# Patient Record
Sex: FEMALE | Race: BLACK OR AFRICAN AMERICAN | Employment: OTHER | URBAN - METROPOLITAN AREA
[De-identification: names, ages, dates, MRNs, and addresses within clinical notes are randomized per-mention and may not be internally consistent; named-entity substitution may affect disease eponyms.]

---

## 2022-06-07 ENCOUNTER — OFFICE VISIT (OUTPATIENT)
Dept: URGENT CARE | Facility: URGENT CARE | Age: 47
End: 2022-06-07
Payer: COMMERCIAL

## 2022-06-07 VITALS
WEIGHT: 155 LBS | DIASTOLIC BLOOD PRESSURE: 82 MMHG | HEART RATE: 79 BPM | TEMPERATURE: 98.1 F | OXYGEN SATURATION: 99 % | SYSTOLIC BLOOD PRESSURE: 122 MMHG

## 2022-06-07 DIAGNOSIS — H65.92 OME (OTITIS MEDIA WITH EFFUSION), LEFT: ICD-10-CM

## 2022-06-07 DIAGNOSIS — H92.02 LEFT EAR PAIN: Primary | ICD-10-CM

## 2022-06-07 PROCEDURE — 99203 OFFICE O/P NEW LOW 30 MIN: CPT | Performed by: FAMILY MEDICINE

## 2022-06-07 RX ORDER — DULOXETIN HYDROCHLORIDE 60 MG/1
CAPSULE, DELAYED RELEASE ORAL
COMMUNITY
Start: 2022-04-18

## 2022-06-07 RX ORDER — BUPROPION HYDROCHLORIDE 300 MG/1
300 TABLET ORAL DAILY
COMMUNITY
Start: 2022-04-21

## 2022-06-07 RX ORDER — AMOXICILLIN 500 MG/1
500 CAPSULE ORAL 3 TIMES DAILY
Qty: 30 CAPSULE | Refills: 0 | Status: SHIPPED | OUTPATIENT
Start: 2022-06-07 | End: 2022-06-17

## 2025-01-29 NOTE — PROGRESS NOTES
Chief Complaint   Patient presents with     Urgent Care     Ear Problem     C/O ear pain for 3 days       Medical Decision Making:  Reviewed with patient about the findings of the clinical exam we will treat with the antibiotic also can do Tylenol to help with the pain if symptoms do not get better over the next 2 to 3 days should follow-up for further relation and treatment.  ASSESMENT AND PLAN    Leonela was seen today for urgent care and ear problem.    Diagnoses and all orders for this visit:    Left ear pain    OME (otitis media with effusion), left  -     amoxicillin (AMOXIL) 500 MG capsule; Take 1 capsule (500 mg) by mouth 3 times daily for 10 days        Tylenol and Rest    Differential Diagnosis:  URI Adult/Peds:  Acute right otitis media, Acute left otitis media, Sinusitis and et dysfunction       See orders in Epic  Pt verbalized and agreed with the plan and is aware of the worsening symptoms for which would need to follow up .  Pt was stable during time of discharge from the clinic     X-Ray was not done.  Time  spent on the date of the encounter doing chart review, patient visit and documentation   SUBJECTIVE   Leonela Dalton is a 46 year old female who presents with left ear pain, fullness and pressure for 3 day(s).   Severity: moderate   Timing:sudden onset  Additional symptoms include nasal congestion.      History of recurrent otitis: no    No past medical history on file.  Current Outpatient Medications   Medication Sig Dispense Refill     buPROPion (WELLBUTRIN XL) 300 MG 24 hr tablet Take 300 mg by mouth daily       DULoxetine (CYMBALTA) 60 MG capsule TAKE 1 CAPSULE BY MOUTH EVERY DAY       Social History     Tobacco Use     Smoking status: Never Smoker     Smokeless tobacco: Never Used   Substance Use Topics     Alcohol use: Not on file           ROS:    10 point ROS of systems including Constitutional, Eyes, Respiratory, Cardiovascular, Gastroenterology, Genitourinary, Integumentary,  Muscularskeletal, Psychiatric ,neurological were all negative except for pertinent positives noted in my HPI       OBJECTIVE:  /82   Pulse 79   Temp 98.1  F (36.7  C) (Tympanic)   Wt 70.3 kg (155 lb)   LMP 06/07/2022   SpO2 99%    EXAM:  The right TM is air/fluid interface     The right auditory canal is normal and without drainage, edema or erythema  The left TM is air/fluid interface and bullae present  The left auditory canal is normal and without drainage, edema or erythema  Oropharynx exam is normal: no lesions, erythema, adenopathy or exudate.  GENERAL: no acute distress  EYES: EOMI,  PERRL, conjunctiva clear  NECK: supple, non-tender to palpation, no adenopathy noted  CV: regular rates and rhythm, normal S1 S2, no murmur noted  PSYCH: mentation appears normal    (Note was completed, in part, with [employer] voice-recognition software. Documentation reviewed, but some grammatical, spelling, and word errors may remain.)  Trena Luz MD           Consideration of Admission/Observation Escalation of Care Considered: Given the patient's higher acuity presentation, including the severity of symptoms and clinical findings   Passed kidney stone with urinary tract infection and neutrophilia, I considered escalation of care however patient is feeling better after IV fluids Zofran and Toradol. This includes the potential need for observation or inpatient admission to ensure appropriate monitoring and management, aimed at preventing clinical deterioration and optimizing outcomes.